# Patient Record
Sex: FEMALE | Race: WHITE | NOT HISPANIC OR LATINO | URBAN - METROPOLITAN AREA
[De-identification: names, ages, dates, MRNs, and addresses within clinical notes are randomized per-mention and may not be internally consistent; named-entity substitution may affect disease eponyms.]

---

## 2017-01-13 ENCOUNTER — OUTPATIENT (OUTPATIENT)
Dept: OUTPATIENT SERVICES | Facility: HOSPITAL | Age: 67
LOS: 1 days | End: 2017-01-13

## 2017-05-02 ENCOUNTER — OUTPATIENT (OUTPATIENT)
Dept: OUTPATIENT SERVICES | Facility: HOSPITAL | Age: 67
LOS: 1 days | End: 2017-05-02

## 2017-05-23 ENCOUNTER — OUTPATIENT (OUTPATIENT)
Dept: OUTPATIENT SERVICES | Facility: HOSPITAL | Age: 67
LOS: 1 days | End: 2017-05-23

## 2017-06-01 ENCOUNTER — OUTPATIENT (OUTPATIENT)
Dept: OUTPATIENT SERVICES | Facility: HOSPITAL | Age: 67
LOS: 1 days | End: 2017-06-01

## 2017-09-20 ENCOUNTER — OUTPATIENT (OUTPATIENT)
Dept: OUTPATIENT SERVICES | Facility: HOSPITAL | Age: 67
LOS: 1 days | End: 2017-09-20

## 2017-09-28 ENCOUNTER — OUTPATIENT (OUTPATIENT)
Dept: OUTPATIENT SERVICES | Facility: HOSPITAL | Age: 67
LOS: 1 days | End: 2017-09-28

## 2017-10-05 ENCOUNTER — OUTPATIENT (OUTPATIENT)
Dept: OUTPATIENT SERVICES | Facility: HOSPITAL | Age: 67
LOS: 1 days | End: 2017-10-05
Payer: MEDICARE

## 2017-10-05 PROCEDURE — 72110 X-RAY EXAM L-2 SPINE 4/>VWS: CPT | Mod: 26

## 2017-10-05 PROCEDURE — 72148 MRI LUMBAR SPINE W/O DYE: CPT | Mod: 26

## 2018-01-11 ENCOUNTER — OUTPATIENT (OUTPATIENT)
Dept: OUTPATIENT SERVICES | Facility: HOSPITAL | Age: 68
LOS: 1 days | End: 2018-01-11

## 2018-05-30 ENCOUNTER — OUTPATIENT (OUTPATIENT)
Dept: OUTPATIENT SERVICES | Facility: HOSPITAL | Age: 68
LOS: 1 days | End: 2018-05-30

## 2018-10-09 ENCOUNTER — OUTPATIENT (OUTPATIENT)
Dept: OUTPATIENT SERVICES | Facility: HOSPITAL | Age: 68
LOS: 1 days | End: 2018-10-09

## 2018-11-05 ENCOUNTER — OUTPATIENT (OUTPATIENT)
Dept: OUTPATIENT SERVICES | Facility: HOSPITAL | Age: 68
LOS: 1 days | End: 2018-11-05
Payer: MEDICARE

## 2018-11-05 PROCEDURE — 76536 US EXAM OF HEAD AND NECK: CPT | Mod: 26

## 2018-11-13 ENCOUNTER — OUTPATIENT (OUTPATIENT)
Dept: OUTPATIENT SERVICES | Facility: HOSPITAL | Age: 68
LOS: 1 days | End: 2018-11-13

## 2018-11-14 ENCOUNTER — OUTPATIENT (OUTPATIENT)
Dept: OUTPATIENT SERVICES | Facility: HOSPITAL | Age: 68
LOS: 1 days | End: 2018-11-14
Payer: MEDICARE

## 2018-11-14 PROCEDURE — 70491 CT SOFT TISSUE NECK W/DYE: CPT | Mod: 26

## 2018-11-15 ENCOUNTER — OUTPATIENT (OUTPATIENT)
Dept: OUTPATIENT SERVICES | Facility: HOSPITAL | Age: 68
LOS: 1 days | End: 2018-11-15

## 2019-01-23 ENCOUNTER — OUTPATIENT (OUTPATIENT)
Dept: OUTPATIENT SERVICES | Facility: HOSPITAL | Age: 69
LOS: 1 days | End: 2019-01-23

## 2019-05-23 ENCOUNTER — OUTPATIENT (OUTPATIENT)
Dept: OUTPATIENT SERVICES | Facility: HOSPITAL | Age: 69
LOS: 1 days | End: 2019-05-23

## 2019-06-03 ENCOUNTER — OUTPATIENT (OUTPATIENT)
Dept: OUTPATIENT SERVICES | Facility: HOSPITAL | Age: 69
LOS: 1 days | End: 2019-06-03

## 2019-09-03 ENCOUNTER — OUTPATIENT (OUTPATIENT)
Dept: OUTPATIENT SERVICES | Facility: HOSPITAL | Age: 69
LOS: 1 days | End: 2019-09-03

## 2019-09-18 ENCOUNTER — OUTPATIENT (OUTPATIENT)
Dept: OUTPATIENT SERVICES | Facility: HOSPITAL | Age: 69
LOS: 1 days | End: 2019-09-18
Payer: MEDICARE

## 2019-09-18 PROCEDURE — 74178 CT ABD&PLV WO CNTR FLWD CNTR: CPT | Mod: 26

## 2019-09-19 ENCOUNTER — OUTPATIENT (OUTPATIENT)
Dept: OUTPATIENT SERVICES | Facility: HOSPITAL | Age: 69
LOS: 1 days | End: 2019-09-19

## 2019-09-30 ENCOUNTER — OUTPATIENT (OUTPATIENT)
Dept: OUTPATIENT SERVICES | Facility: HOSPITAL | Age: 69
LOS: 1 days | End: 2019-09-30

## 2019-10-10 ENCOUNTER — OUTPATIENT (OUTPATIENT)
Dept: OUTPATIENT SERVICES | Facility: HOSPITAL | Age: 69
LOS: 1 days | End: 2019-10-10

## 2019-10-31 ENCOUNTER — OUTPATIENT (OUTPATIENT)
Dept: OUTPATIENT SERVICES | Facility: HOSPITAL | Age: 69
LOS: 1 days | End: 2019-10-31

## 2019-12-10 ENCOUNTER — OUTPATIENT (OUTPATIENT)
Dept: OUTPATIENT SERVICES | Facility: HOSPITAL | Age: 69
LOS: 1 days | End: 2019-12-10

## 2020-03-26 ENCOUNTER — OUTPATIENT (OUTPATIENT)
Dept: OUTPATIENT SERVICES | Facility: HOSPITAL | Age: 70
LOS: 1 days | End: 2020-03-26

## 2020-04-15 ENCOUNTER — APPOINTMENT (OUTPATIENT)
Dept: ULTRASOUND IMAGING | Facility: CLINIC | Age: 70
End: 2020-04-15
Payer: MEDICARE

## 2020-04-15 PROCEDURE — 93971 EXTREMITY STUDY: CPT | Mod: LT

## 2020-06-26 ENCOUNTER — OUTPATIENT (OUTPATIENT)
Dept: OUTPATIENT SERVICES | Facility: HOSPITAL | Age: 70
LOS: 1 days | End: 2020-06-26

## 2020-07-27 ENCOUNTER — APPOINTMENT (OUTPATIENT)
Dept: MAMMOGRAPHY | Facility: CLINIC | Age: 70
End: 2020-07-27
Payer: MEDICARE

## 2020-07-27 ENCOUNTER — APPOINTMENT (OUTPATIENT)
Dept: RADIOLOGY | Facility: CLINIC | Age: 70
End: 2020-07-27

## 2020-07-27 PROCEDURE — 77080 DXA BONE DENSITY AXIAL: CPT

## 2020-07-27 PROCEDURE — 77067 SCR MAMMO BI INCL CAD: CPT

## 2020-07-27 PROCEDURE — 77063 BREAST TOMOSYNTHESIS BI: CPT

## 2020-11-17 ENCOUNTER — OUTPATIENT (OUTPATIENT)
Dept: OUTPATIENT SERVICES | Facility: HOSPITAL | Age: 70
LOS: 1 days | End: 2020-11-17

## 2020-12-03 ENCOUNTER — APPOINTMENT (OUTPATIENT)
Dept: MRI IMAGING | Facility: CLINIC | Age: 70
End: 2020-12-03
Payer: MEDICARE

## 2020-12-03 PROCEDURE — 73721 MRI JNT OF LWR EXTRE W/O DYE: CPT | Mod: LT,MH

## 2021-06-08 ENCOUNTER — OUTPATIENT (OUTPATIENT)
Dept: OUTPATIENT SERVICES | Facility: HOSPITAL | Age: 71
LOS: 1 days | End: 2021-06-08

## 2021-07-09 ENCOUNTER — OUTPATIENT (OUTPATIENT)
Dept: OUTPATIENT SERVICES | Facility: HOSPITAL | Age: 71
LOS: 1 days | End: 2021-07-09

## 2021-08-25 ENCOUNTER — APPOINTMENT (OUTPATIENT)
Dept: RADIOLOGY | Facility: CLINIC | Age: 71
End: 2021-08-25
Payer: MEDICARE

## 2021-08-25 ENCOUNTER — APPOINTMENT (OUTPATIENT)
Dept: MRI IMAGING | Facility: CLINIC | Age: 71
End: 2021-08-25
Payer: MEDICARE

## 2021-08-25 PROCEDURE — 72148 MRI LUMBAR SPINE W/O DYE: CPT | Mod: MH

## 2021-08-25 PROCEDURE — 73502 X-RAY EXAM HIP UNI 2-3 VIEWS: CPT | Mod: RT

## 2021-12-10 ENCOUNTER — OUTPATIENT (OUTPATIENT)
Dept: OUTPATIENT SERVICES | Facility: HOSPITAL | Age: 71
LOS: 1 days | End: 2021-12-10

## 2022-04-28 ENCOUNTER — OUTPATIENT (OUTPATIENT)
Dept: OUTPATIENT SERVICES | Facility: HOSPITAL | Age: 72
LOS: 1 days | End: 2022-04-28
Payer: MEDICARE

## 2022-04-28 DIAGNOSIS — R94.6 ABNORMAL RESULTS OF THYROID FUNCTION STUDIES: ICD-10-CM

## 2022-04-29 PROCEDURE — 78014 THYROID IMAGING W/BLOOD FLOW: CPT | Mod: 26,MH

## 2022-06-10 ENCOUNTER — APPOINTMENT (OUTPATIENT)
Dept: RADIOLOGY | Facility: CLINIC | Age: 72
End: 2022-06-10
Payer: MEDICARE

## 2022-06-10 PROCEDURE — 71046 X-RAY EXAM CHEST 2 VIEWS: CPT

## 2022-11-11 ENCOUNTER — NON-APPOINTMENT (OUTPATIENT)
Age: 72
End: 2022-11-11

## 2022-11-11 RX ORDER — EZETIMIBE 10 MG/1
10 TABLET ORAL
Refills: 0 | Status: ACTIVE | COMMUNITY

## 2023-01-04 ENCOUNTER — APPOINTMENT (OUTPATIENT)
Dept: ENDOCRINOLOGY | Facility: CLINIC | Age: 73
End: 2023-01-04
Payer: MEDICARE

## 2023-01-04 VITALS
HEART RATE: 65 BPM | TEMPERATURE: 98.4 F | BODY MASS INDEX: 30.53 KG/M2 | WEIGHT: 190 LBS | SYSTOLIC BLOOD PRESSURE: 116 MMHG | DIASTOLIC BLOOD PRESSURE: 62 MMHG | HEIGHT: 66 IN | OXYGEN SATURATION: 97 %

## 2023-01-04 DIAGNOSIS — E11.9 TYPE 2 DIABETES MELLITUS W/OUT COMPLICATIONS: ICD-10-CM

## 2023-01-04 DIAGNOSIS — Z79.4 TYPE 2 DIABETES MELLITUS W/OUT COMPLICATIONS: ICD-10-CM

## 2023-01-04 PROCEDURE — 99214 OFFICE O/P EST MOD 30 MIN: CPT

## 2023-01-04 NOTE — HISTORY OF PRESENT ILLNESS
[FreeTextEntry1] : I this is a 72-year-old white female with a past medical history of a type 2 diabetes currently on insulin mix 7525 with 25 units in the morning 15 units in the evening and, insulin Lantus 25 units every day at night, Farxiga 10 mg daily and Victoza 1.2 mg daily.  Patient also has a history of elevated cholesterol and primary hypertension..  According to the patient she recently returned from [place] but had a very stressful episode where her  was hospitalized for a stroke.  Since she has returned her sugar levels have been elevated anywhere between 150 to 250 mg per DL.  She claims that her appetite is very good and denies any symptoms of hypothyroidism..  Her vision has been stable and she denies any numbness of her extremities.  Her weight is stable.  She denies any chest pain shortness of breath nausea vomiting abdominal pain or diarrhea.  Patient also has a diagnosis of hyper thyroidism for which she is taking methimazole 10 mg tablets daily

## 2023-01-04 NOTE — ASSESSMENT
[Diabetes Foot Care] : diabetes foot care [Long Term Vascular Complications] : long term vascular complications of diabetes [Carbohydrate Consistent Diet] : carbohydrate consistent diet [Importance of Diet and Exercise] : importance of diet and exercise to improve glycemic control, achieve weight loss and improve cardiovascular health [Exercise/Effect on Glucose] : exercise/effect on glucose [Hypoglycemia Management] : hypoglycemia management [Self Monitoring of Blood Glucose] : self monitoring of blood glucose [Retinopathy Screening] : Patient was referred to ophthalmology for retinopathy screening [FreeTextEntry1] : Pleasant middle-aged white female who has a past medical history of uncontrolled type 2 diabetes with hypothyroidism.  Her other comorbid conditions include hyperlipidemia and hypertension.  Patient appears to be noncompliant with diet due to the recent stress which was caused by her 's illness.  However the patient is not monitoring her dietary intake and her glucose levels have been fluctuating anywhere between 100 to 300 mg per DL.  Patient's labs from 12/13/2022 show a TSH of 5.06, complete metabolic panel is normal except for serum creatinine of 1.38, her hemoglobin A1c was significantly elevated at 9.5%, lipid panel showed an LDL of 71 total cholesterol of 161 mg per DL, her free T4 is 0.9.  My clinical impression is that this pleasant middle-aged white female is not very compliant with her diet which is the primary factor contributing to mostly elevated glucose levels.  However she also has been experiencing significant family stress and her eating habits have not changed.  Recommendation\par 1.  I have advised the patient to change her insulin Humalog 75/25 to plain insulin Humalog before the meals anywhere between 5 to 15 units depending on her on her glucose levels.\par 2.  We shall continue the patient on Farxiga 10 mg daily insulin Lantus 25 units every day and also the Victoza.\par 3.  I am also lowering the patient's dose of methimazole to 5 mg tablets daily as the TSH level is slightly elevated.  We will monitor edema in about 2 to 3 months and if it improves we will make reduce the dose of the methimazole even further.  The above plan was discussed in detail with the patient.  Strict glycemic control was stressed upon the patient in order to prevent any vascular complications.

## 2023-01-04 NOTE — REVIEW OF SYSTEMS
[Fatigue] : fatigue [Decreased Appetite] : appetite not decreased [Insomnia] : insomnia [Polydipsia] : polydipsia [Negative] : Heme/Lymph [de-identified] : Polydipsia and chronic fatigue

## 2023-01-04 NOTE — PHYSICAL EXAM
[Alert] : alert [Well Nourished] : well nourished [Obese] : obese [Normal Sclera/Conjunctiva] : normal sclera/conjunctiva [PERRL] : pupils equal, round and reactive to light [Normal Outer Ear/Nose] : the ears and nose were normal in appearance [Normal Hearing] : hearing was normal [Normal Nasal Mucosa] : the nasal mucosa was normal [Normal Oropharynx] : the oropharynx was normal [Clear to Auscultation] : lungs were clear to auscultation bilaterally [Normal S1, S2] : normal S1 and S2 [No Murmurs] : no murmurs [Normal Rate] : heart rate was normal [Regular Rhythm] : with a regular rhythm [Carotids Normal] : carotid pulses were normal with no bruits [No Edema] : no peripheral edema [Pedal Pulses Normal] : the pedal pulses are present [Not Tender] : non-tender [No Masses] : no abdominal mass palpated [Soft] : abdomen soft [No HSM] : no hepato-splenomegaly [Normal Supraclavicular Nodes] : no supraclavicular lymphadenopathy [Normal Anterior Cervical Nodes] : no anterior cervical lymphadenopathy [Normal Posterior Cervical Nodes] : no posterior cervical lymphadenopathy [No CVA Tenderness] : no ~M costovertebral angle tenderness [No Spinal Tenderness] : no spinal tenderness [Normal Gait] : normal gait [No Clubbing, Cyanosis] : no clubbing  or cyanosis of the fingernails [No Joint Swelling] : no joint swelling seen [No Rash] : no rash [No Skin Lesions] : no skin lesions [No Motor Deficits] : the motor exam was normal [Normal Reflexes] : deep tendon reflexes were 2+ and symmetric [No Tremors] : no tremors [Oriented x3] : oriented to person, place, and time [Normal Affect] : the affect was normal [de-identified] : Slightly prominent thyroid gland with mild nodularity [de-identified] : Deferred [FreeTextEntry1] : Deferred [de-identified] : Deferred [de-identified] : Dry skin

## 2023-04-26 ENCOUNTER — APPOINTMENT (OUTPATIENT)
Dept: ENDOCRINOLOGY | Facility: CLINIC | Age: 73
End: 2023-04-26
Payer: MEDICARE

## 2023-04-26 VITALS
DIASTOLIC BLOOD PRESSURE: 60 MMHG | HEART RATE: 80 BPM | OXYGEN SATURATION: 99 % | TEMPERATURE: 98.2 F | HEIGHT: 66 IN | BODY MASS INDEX: 31.18 KG/M2 | WEIGHT: 194 LBS | SYSTOLIC BLOOD PRESSURE: 126 MMHG | RESPIRATION RATE: 14 BRPM

## 2023-04-26 DIAGNOSIS — Z79.4 TYPE 2 DIABETES MELLITUS WITH DIABETIC CHRONIC KIDNEY DISEASE: ICD-10-CM

## 2023-04-26 DIAGNOSIS — E11.22 TYPE 2 DIABETES MELLITUS WITH DIABETIC CHRONIC KIDNEY DISEASE: ICD-10-CM

## 2023-04-26 DIAGNOSIS — Z00.00 ENCOUNTER FOR GENERAL ADULT MEDICAL EXAMINATION W/OUT ABNORMAL FINDINGS: ICD-10-CM

## 2023-04-26 PROCEDURE — 99214 OFFICE O/P EST MOD 30 MIN: CPT

## 2023-04-26 RX ORDER — LIRAGLUTIDE 6 MG/ML
18 INJECTION SUBCUTANEOUS DAILY
Qty: 27 | Refills: 3 | Status: DISCONTINUED | COMMUNITY
Start: 2023-01-04 | End: 2023-04-26

## 2023-04-26 RX ORDER — DAPAGLIFLOZIN 10 MG/1
10 TABLET, FILM COATED ORAL
Refills: 0 | Status: DISCONTINUED | COMMUNITY
End: 2023-04-26

## 2023-04-26 NOTE — HISTORY OF PRESENT ILLNESS
[FreeTextEntry1] : This is a 70-year-old white female who has a past medical history of uncontrolled type 2 diabetes hyperlipidemia hypertension and hypothyroidism.  Patient is currently on Farxiga 10 mg tablets daily, Lantus 25 units every day at night Victoza which she ran out of it insulin Humalog as needed before meals and methimazole 5 mg tablets every day.  She also has a history of elevated cholesterol patient recently has been under taking care of her . .  She has not noticed any numbness of her diarrhea.  Review of systems is otherwise negative her glucose levels have been elevated in the morning between 1  and after the meals more than 250 mg per DL.  Her other medications include Crestor 40 mg daily, lisinopril with hydrochlorothiazide, methimazole 5 mg tablets daily Zetia milligrams daily.  She denies any symptoms of hypoglycemia.

## 2023-04-26 NOTE — REVIEW OF SYSTEMS
[Fatigue] : fatigue [Polyuria] : polyuria [Dysuria] : dysuria [Nocturia] : nocturia [Dizziness] : no dizziness [Confusion] : confusion [Tremors] : no tremors [Pain/Numbness of Digits] : no pain/numbness of digits [Depression] : depression [Insomnia] : insomnia [Anxiety] : anxiety [Polydipsia] : polydipsia [Negative] : Heme/Lymph

## 2023-04-26 NOTE — ASSESSMENT
[Diabetes Foot Care] : diabetes foot care [Long Term Vascular Complications] : long term vascular complications of diabetes [Carbohydrate Consistent Diet] : carbohydrate consistent diet [Importance of Diet and Exercise] : importance of diet and exercise to improve glycemic control, achieve weight loss and improve cardiovascular health [Exercise/Effect on Glucose] : exercise/effect on glucose [Hypoglycemia Management] : hypoglycemia management [Self Monitoring of Blood Glucose] : self monitoring of blood glucose [Retinopathy Screening] : Patient was referred to ophthalmology for retinopathy screening [FreeTextEntry1] : Middle-aged white female who has a past medical history of uncontrolled [place] hyperlipidemia hypertension and hypothyroidism.  She also has underlying significant anxiety.  She is not on the diet.  Her latest blood test from April 14, 2023 show elevated hemoglobin A1c of 9.6%, BUN is 36 creatinine 1.20 her GFR is 48.  Her TSH is slightly elevated at 4.6 Free T4 is low normal at 0.93.  Clinical impression is that this may male who is not very compliant with the diet and exercise has persistently elevated levels of hemoglobin A1c due to her noncompliance with exercise.  Patient claimed that she is always feeling very hungry and has to take significant amount of snacks and meal portions.  Recommendation\par 1.  The patient is unable to afford to buy the Victoza at this time and for this reason I am starting her on Soliqua insulin 30 units every day in the morning.  Side effects of the medication were explained to the patient\par 2.  We will continue her on the Farxiga 10 mg daily, insulin Lantus 25 units at night and Humalog insulin before meals as needed.\par 3.  Regarding her thyroid disorder I would lower the dose of the methimazole to 2.5 mg tablets every day and repeat her thyroid profile in approximately 6 to 8 weeks.\par 4.  Patient will continue the remaining medications which include daily lisinopril with hydrochlorothiazide, Crestor and Zetia.\par 5.  Patient is made aware of the need to maintain normal blood sugar levels now to prevent any future vascular complications.  The plan was discussed in detail with the patient , she will return to the office in approximately 3 to 4 months time after obtaining a repeat blood analysis.  Thank you

## 2023-05-12 ENCOUNTER — RX RENEWAL (OUTPATIENT)
Age: 73
End: 2023-05-12

## 2023-08-29 ENCOUNTER — APPOINTMENT (OUTPATIENT)
Dept: ENDOCRINOLOGY | Facility: CLINIC | Age: 73
End: 2023-08-29
Payer: MEDICARE

## 2023-08-29 VITALS
DIASTOLIC BLOOD PRESSURE: 60 MMHG | SYSTOLIC BLOOD PRESSURE: 120 MMHG | TEMPERATURE: 98 F | HEART RATE: 64 BPM | WEIGHT: 207 LBS | RESPIRATION RATE: 16 BRPM | HEIGHT: 66 IN | BODY MASS INDEX: 33.27 KG/M2 | OXYGEN SATURATION: 97 %

## 2023-08-29 DIAGNOSIS — E78.5 HYPERLIPIDEMIA, UNSPECIFIED: ICD-10-CM

## 2023-08-29 LAB
ALBUMIN SERPL ELPH-MCNC: 4.1 G/DL
ALP BLD-CCNC: 92 U/L
ALT SERPL-CCNC: 32 U/L
ANION GAP SERPL CALC-SCNC: 11 MMOL/L
AST SERPL-CCNC: 36 U/L
BILIRUB SERPL-MCNC: 0.3 MG/DL
BUN SERPL-MCNC: 22 MG/DL
CALCIUM SERPL-MCNC: 9.1 MG/DL
CHLORIDE SERPL-SCNC: 107 MMOL/L
CO2 SERPL-SCNC: 26 MMOL/L
CREAT SERPL-MCNC: 1.22 MG/DL
EGFR: 47 ML/MIN/1.73M2
ESTIMATED AVERAGE GLUCOSE: 209 MG/DL
GLUCOSE SERPL-MCNC: 121 MG/DL
HBA1C MFR BLD HPLC: 8.9 %
POTASSIUM SERPL-SCNC: 5 MMOL/L
PROT SERPL-MCNC: 6.1 G/DL
SODIUM SERPL-SCNC: 144 MMOL/L
T4 FREE SERPL-MCNC: 0.9 NG/DL
TSH SERPL-ACNC: 3.15 UIU/ML

## 2023-08-29 PROCEDURE — 99213 OFFICE O/P EST LOW 20 MIN: CPT

## 2023-08-29 RX ORDER — INSULIN GLARGINE AND LIXISENATIDE 100; 33 U/ML; UG/ML
100-33 INJECTION, SOLUTION SUBCUTANEOUS EVERY MORNING
Qty: 9 | Refills: 0 | Status: DISCONTINUED | COMMUNITY
Start: 2023-04-26 | End: 2023-08-29

## 2023-08-29 RX ORDER — ORAL SEMAGLUTIDE 7 MG/1
7 TABLET ORAL
Qty: 90 | Refills: 3 | Status: ACTIVE | COMMUNITY
Start: 2023-08-29 | End: 1900-01-01

## 2023-08-29 NOTE — HISTORY OF PRESENT ILLNESS
[FreeTextEntry1] : 72-year-old white female with a past medical history of type 2 diabetes currently maintained on Farxiga 10 mg daily and insulin Lantus at 25 units every day at night and Humalog insulin as per the sliding scale before meals.  Patient was also taking Soliqua 30 units every day in the morning but she claimed that she stopped it as it was not helping her to curb her appetite to lose any weight.  She also has a history of hypertension and hypothyroidism for which she is taking methimazole half a tablet of 5 mg every day..  She denies any dysuria, chest pain, shortness of breath.  She does report that she is always very hungry and is constantly eating while she is at home.  Her current medications also include Crestor, lisinopril/hydrochlorothiazide.And Zetia.  Physically she is not very active and does not exercise on a regular basis except for occasional walks around the complex where she resides

## 2023-08-29 NOTE — PHYSICAL EXAM
[Alert] : alert [Healthy Appearance] : healthy appearance [No Acute Distress] : no acute distress [Well Developed] : well developed [Normal Sclera/Conjunctiva] : normal sclera/conjunctiva [PERRL] : pupils equal, round and reactive to light [Normal Outer Ear/Nose] : the ears and nose were normal in appearance [Normal Hearing] : hearing was normal [No Neck Mass] : no neck mass was observed [Thyroid Not Enlarged] : the thyroid was not enlarged [No Respiratory Distress] : no respiratory distress [Clear to Auscultation] : lungs were clear to auscultation bilaterally [Normal S1, S2] : normal S1 and S2 [Normal Rate] : heart rate was normal [Regular Rhythm] : with a regular rhythm [Carotids Normal] : carotid pulses were normal with no bruits [Pedal Pulses Normal] : the pedal pulses are present [Not Tender] : non-tender [Not Distended] : not distended [Soft] : abdomen soft [No HSM] : no hepato-splenomegaly [Normal Supraclavicular Nodes] : no supraclavicular lymphadenopathy [Normal Anterior Cervical Nodes] : no anterior cervical lymphadenopathy [Normal Posterior Cervical Nodes] : no posterior cervical lymphadenopathy [Normal Gait] : normal gait [No Clubbing, Cyanosis] : no clubbing  or cyanosis of the fingernails [No Rash] : no rash [No Skin Lesions] : no skin lesions [Abdominal Striae] : no abdominal striae [Acanthosis Nigricans] : no acanthosis nigricans [Foot Ulcers] : no foot ulcers [No Motor Deficits] : the motor exam was normal [No Sensory Deficits] : the sensory exam was normal to light touch and pinprick [Normal Reflexes] : deep tendon reflexes were 2+ and symmetric [No Tremors] : no tremors [Oriented x3] : oriented to person, place, and time [de-identified] : Mild bilateral trace edema more prominent of the left ankle [de-identified] : Deferred [FreeTextEntry1] : Deferred [de-identified] : Deferred [de-identified] : Weight gain, anxiety

## 2023-08-29 NOTE — REVIEW OF SYSTEMS
[Fatigue] : fatigue [Dizziness] : no dizziness [Tremors] : no tremors [Pain/Numbness of Digits] : no pain/numbness of digits [Polydipsia] : polydipsia [Heat Intolerance] : heat intolerance [Negative] : Heme/Lymph

## 2023-08-29 NOTE — ASSESSMENT
[Diabetes Foot Care] : diabetes foot care [Long Term Vascular Complications] : long term vascular complications of diabetes [Carbohydrate Consistent Diet] : carbohydrate consistent diet [Importance of Diet and Exercise] : importance of diet and exercise to improve glycemic control, achieve weight loss and improve cardiovascular health [Exercise/Effect on Glucose] : exercise/effect on glucose [Hypoglycemia Management] : hypoglycemia management [Self Monitoring of Blood Glucose] : self monitoring of blood glucose [Retinopathy Screening] : Patient was referred to ophthalmology for retinopathy screening [FreeTextEntry1] : Middle-age white female with a past medical history of obesity, type 2 diabetes on combination of insulin with Farxiga and alcohol.  Patient is noncompliant with the diet.  She until recently was on Soliqua which she had discontinued.  Patient had a blood test done on August 22 which showed a normal complete metabolic panel except for a GFR of 47, TSH is 3.15 Free T4 is 0.9 hemoglobin A1c is 8.9% the above findings were reviewed with the patient.  Clinical impression is that this middle-age white female has uncontrolled type 2 diabetes most likely secondary to poor compliance with diet and exercise.  Recommendation 1.  I have advised the patient to give a trial with Rybelsus 3 mg tablets daily in the morning on empty stomach.  Benefits and side effects were explained to the patient.  If she does not develop any significant side effects then we will increase the dose to 7 mg after 4 weeks..  This was explained to the patient in detail. 2.  Patient will continue with insulin Lantus 25 units at night together with Humalog insulin before meals and the Farxiga 10 mg tablets daily. 3.  Patient will have a repeat blood test performed in 3 months time and if the thyroid function test remains stable we will then slowly taper her off the methimazole.  The plan was discussed in detail with the patient.  Thank you

## 2023-11-06 ENCOUNTER — RX RENEWAL (OUTPATIENT)
Age: 73
End: 2023-11-06

## 2023-11-06 RX ORDER — DAPAGLIFLOZIN 10 MG/1
10 TABLET, FILM COATED ORAL DAILY
Qty: 90 | Refills: 3 | Status: ACTIVE | COMMUNITY
Start: 2023-01-04 | End: 1900-01-01

## 2023-12-05 ENCOUNTER — APPOINTMENT (OUTPATIENT)
Dept: ULTRASOUND IMAGING | Facility: CLINIC | Age: 73
End: 2023-12-05
Payer: MEDICARE

## 2023-12-05 PROCEDURE — 93880 EXTRACRANIAL BILAT STUDY: CPT

## 2023-12-06 ENCOUNTER — APPOINTMENT (OUTPATIENT)
Dept: RADIOLOGY | Facility: CLINIC | Age: 73
End: 2023-12-06

## 2023-12-06 ENCOUNTER — APPOINTMENT (OUTPATIENT)
Dept: ULTRASOUND IMAGING | Facility: CLINIC | Age: 73
End: 2023-12-06
Payer: MEDICARE

## 2023-12-06 ENCOUNTER — APPOINTMENT (OUTPATIENT)
Dept: MAMMOGRAPHY | Facility: CLINIC | Age: 73
End: 2023-12-06

## 2023-12-06 PROCEDURE — 77080 DXA BONE DENSITY AXIAL: CPT

## 2023-12-06 PROCEDURE — 77063 BREAST TOMOSYNTHESIS BI: CPT

## 2023-12-06 PROCEDURE — 77067 SCR MAMMO BI INCL CAD: CPT

## 2023-12-06 PROCEDURE — 76775 US EXAM ABDO BACK WALL LIM: CPT

## 2023-12-08 ENCOUNTER — APPOINTMENT (OUTPATIENT)
Dept: ENDOCRINOLOGY | Facility: CLINIC | Age: 73
End: 2023-12-08

## 2023-12-12 ENCOUNTER — RX ONLY (RX ONLY)
Age: 73
End: 2023-12-12

## 2023-12-12 ENCOUNTER — OFFICE (OUTPATIENT)
Facility: LOCATION | Age: 73
Setting detail: OPHTHALMOLOGY
End: 2023-12-12
Payer: MEDICARE

## 2023-12-12 DIAGNOSIS — H52.4: ICD-10-CM

## 2023-12-12 DIAGNOSIS — H10.433: ICD-10-CM

## 2023-12-12 DIAGNOSIS — H43.813: ICD-10-CM

## 2023-12-12 DIAGNOSIS — H35.033: ICD-10-CM

## 2023-12-12 DIAGNOSIS — H52.221: ICD-10-CM

## 2023-12-12 DIAGNOSIS — H35.373: ICD-10-CM

## 2023-12-12 DIAGNOSIS — H16.223: ICD-10-CM

## 2023-12-12 DIAGNOSIS — E11.3293: ICD-10-CM

## 2023-12-12 DIAGNOSIS — H43.393: ICD-10-CM

## 2023-12-12 DIAGNOSIS — H52.12: ICD-10-CM

## 2023-12-12 PROBLEM — Z96.1 PSEUDOPHAKIA ; BOTH EYES: Status: ACTIVE | Noted: 2023-12-12

## 2023-12-12 PROCEDURE — 92015 DETERMINE REFRACTIVE STATE: CPT | Performed by: OPHTHALMOLOGY

## 2023-12-12 PROCEDURE — 92134 CPTRZ OPH DX IMG PST SGM RTA: CPT | Performed by: OPHTHALMOLOGY

## 2023-12-12 PROCEDURE — 92014 COMPRE OPH EXAM EST PT 1/>: CPT | Performed by: OPHTHALMOLOGY

## 2023-12-12 ASSESSMENT — CONFRONTATIONAL VISUAL FIELD TEST (CVF)
OS_FINDINGS: FULL
OD_FINDINGS: FULL

## 2023-12-12 ASSESSMENT — SUPERFICIAL PUNCTATE KERATITIS (SPK)
OS_SPK: T 1+
OD_SPK: T 1+

## 2023-12-13 ASSESSMENT — REFRACTION_CURRENTRX
OD_CYLINDER: +1.25
OS_SPHERE: -0.50
OD_OVR_VA: 20/
OD_ADD: +2.50
OD_AXIS: 160
OS_CYLINDER: SPHERE
OS_ADD: +2.50
OS_OVR_VA: 20/
OD_SPHERE: PLANO

## 2023-12-13 ASSESSMENT — REFRACTION_MANIFEST
OD_ADD: +2.50
OS_ADD: +2.50
OD_VA1: 20/30
OS_CYLINDER: SPHERE
OD_AXIS: 160
OD_CYLINDER: +1.00
OD_SPHERE: PLANO
OS_SPHERE: -0.50
OS_VA1: 20/30

## 2024-01-03 RX ORDER — METHIMAZOLE 5 MG/1
5 TABLET ORAL
Qty: 45 | Refills: 3 | Status: ACTIVE | COMMUNITY
Start: 1900-01-01 | End: 1900-01-01

## 2024-01-25 RX ORDER — BLOOD SUGAR DIAGNOSTIC
STRIP MISCELLANEOUS
Qty: 2 | Refills: 3 | Status: ACTIVE | COMMUNITY
Start: 2024-01-25 | End: 1900-01-01

## 2024-02-08 ENCOUNTER — APPOINTMENT (OUTPATIENT)
Dept: ENDOCRINOLOGY | Facility: CLINIC | Age: 74
End: 2024-02-08
Payer: MEDICARE

## 2024-02-08 VITALS
WEIGHT: 195 LBS | OXYGEN SATURATION: 98 % | HEART RATE: 68 BPM | RESPIRATION RATE: 16 BRPM | TEMPERATURE: 97.6 F | SYSTOLIC BLOOD PRESSURE: 130 MMHG | BODY MASS INDEX: 31.34 KG/M2 | HEIGHT: 66 IN | DIASTOLIC BLOOD PRESSURE: 60 MMHG

## 2024-02-08 DIAGNOSIS — E05.90 THYROTOXICOSIS, UNSPECIFIED W/OUT THYROTOXIC CRISIS OR STORM: ICD-10-CM

## 2024-02-08 DIAGNOSIS — I10 ESSENTIAL (PRIMARY) HYPERTENSION: ICD-10-CM

## 2024-02-08 PROCEDURE — 99214 OFFICE O/P EST MOD 30 MIN: CPT

## 2024-02-08 RX ORDER — ROSUVASTATIN CALCIUM 40 MG/1
40 TABLET, FILM COATED ORAL
Refills: 0 | Status: DISCONTINUED | COMMUNITY
End: 2024-02-08

## 2024-02-08 RX ORDER — MULTIVIT-MIN/IRON/FOLIC ACID/K 18-600-40
CAPSULE ORAL
Refills: 0 | Status: ACTIVE | COMMUNITY

## 2024-02-08 RX ORDER — VERAPAMIL HYDROCHLORIDE 120 MG/1
120 TABLET ORAL
Refills: 0 | Status: ACTIVE | COMMUNITY

## 2024-02-08 RX ORDER — LISINOPRIL AND HYDROCHLOROTHIAZIDE TABLETS 10; 12.5 MG/1; MG/1
TABLET ORAL
Refills: 0 | Status: DISCONTINUED | COMMUNITY
End: 2024-02-08

## 2024-02-08 RX ORDER — INSULIN GLARGINE 100 [IU]/ML
INJECTION, SOLUTION SUBCUTANEOUS
Refills: 0 | Status: DISCONTINUED | COMMUNITY
End: 2024-02-08

## 2024-02-08 RX ORDER — ESCITALOPRAM OXALATE 10 MG/1
10 TABLET, FILM COATED ORAL
Refills: 0 | Status: ACTIVE | COMMUNITY

## 2024-02-08 RX ORDER — ACETAMINOPHEN 325 MG
TABLET ORAL
Refills: 0 | Status: ACTIVE | COMMUNITY

## 2024-02-08 RX ORDER — ASPIRIN 81 MG/1
81 TABLET ORAL
Refills: 0 | Status: ACTIVE | COMMUNITY

## 2024-02-08 RX ORDER — ORAL SEMAGLUTIDE 14 MG/1
14 TABLET ORAL
Qty: 90 | Refills: 2 | Status: ACTIVE | COMMUNITY
Start: 2024-02-08 | End: 1900-01-01

## 2024-02-08 RX ORDER — ROSUVASTATIN CALCIUM 40 MG/1
40 TABLET, FILM COATED ORAL
Refills: 0 | Status: ACTIVE | COMMUNITY

## 2024-02-08 RX ORDER — FLASH GLUCOSE SENSOR
KIT MISCELLANEOUS
Qty: 6 | Refills: 3 | Status: DISCONTINUED | COMMUNITY
Start: 2023-08-29 | End: 2024-02-08

## 2024-02-08 RX ORDER — FLASH GLUCOSE SCANNING READER
EACH MISCELLANEOUS
Qty: 1 | Refills: 0 | Status: DISCONTINUED | COMMUNITY
Start: 2023-08-29 | End: 2024-02-08

## 2024-02-08 NOTE — HISTORY OF PRESENT ILLNESS
[FreeTextEntry1] : Patient last seen 8/29/2023 most recent labs dated for 11/30/2023 from [facility]. 73-year-old white female who has a past medical history of uncontrolled type 2 diabetes, obesity, hyperlipidemia and hypothyroidism presents for routine follow-up.  Patient is currently taking Lantus 30 units every day at night, Farxiga 10 mg daily and Rybelsus 7 mg daily.  She also takes Humalog insulin 15 to 20 units 3 times a day before meals and methimazole 5 mg daily.  Patient admits that she is not very compliant with the diet and is not able to exercise.  She has slowly been gaining weight.  She denies any palpitation chest pain but she does have polyuria polydipsia but without any dysuria.  Patient denies any symptoms of palpitation chest pain nausea vomiting abdominal pain no skin rash.  Patient is expressing the ambition to see a bariatric surgeon for the purpose of weight loss as she is frustrated with her weight and and difficulty in losing it.

## 2024-02-08 NOTE — REVIEW OF SYSTEMS
[Fatigue] : fatigue [Dysphagia] : no dysphagia [Neck Pain] : no neck pain [Dysphonia] : no dysphonia [Nausea] : no nausea [Vomiting] : no vomiting [Polyuria] : no polyuria [Dysuria] : no dysuria [Nocturia] : nocturia [Incontinence] : incontinence [Headaches] : no headaches [Confusion] : confusion [Tremors] : no tremors [Pain/Numbness of Digits] : pain/numbness of digits [Anxiety] : anxiety [Stress] : stress [Galactorrhea] : no galactorrhea  [Negative] : Heme/Lymph [FreeTextEntry7] : Central obesity

## 2024-02-08 NOTE — ASSESSMENT
[FreeTextEntry1] : Middle-age white female who has a past medical history of uncontrolled type 2 diabetes with hypothyroidism and is currently taking insulin Lantus together with Farxiga and Rybelsus.  She has been tolerating the medications well without any side effects.  Clinically she appears to be euthyroid.  Patient is now tested in undergoing a gastric bypass surgery and also she has a history of urinary incontinence and would like to see a urologist for the correction of this symptom.  Recommendation 1.  I have advised the patient to continue with her current diabetic medications but I have decided to increase her Rybelsus to 14 mg tablets every day. 2.  Patient will continue with insulin Humalog 15 to 20 units 3 times a day and also the methimazole 5 mg tablet daily. 3.  I am referring the patient for a complete blood test in approximately 1 month's time. 4.  The patient is also being referred to a bariatric surgeon for evaluation for possible sleeve gastrectomy may and also she will be seeking opinion of a urologist to correct her urinary problem. 5.  Patient will continue her other medications which include rosuvastatin 40 mg daily, verapamil  daily, Zetia 10 mg daily, baby aspirin 81 mg tablet daily.  And she also takes escitalopram 10 mg daily for anxiety. 6..  Patient was explained in detail the importance of diet and exercise in order to control her symptoms of uncontrolled type 2 diabetes. 1.  She will return to the office in approximately 3 months time when we will review her results and make adjustments as necessary.  Thank you

## 2024-02-09 LAB
ESTIMATED AVERAGE GLUCOSE: 237
HBA1C MFR BLD HPLC: 9.9

## 2024-02-12 DIAGNOSIS — E11.9 TYPE 2 DIABETES MELLITUS W/OUT COMPLICATIONS: ICD-10-CM

## 2024-02-12 RX ORDER — PEN NEEDLE, DIABETIC 29 G X1/2"
32G X 4 MM NEEDLE, DISPOSABLE MISCELLANEOUS
Qty: 4 | Refills: 3 | Status: ACTIVE | COMMUNITY
Start: 2024-02-12 | End: 1900-01-01

## 2024-02-12 RX ORDER — INSULIN LISPRO 100 [IU]/ML
100 INJECTION, SOLUTION INTRAVENOUS; SUBCUTANEOUS
Qty: 4 | Refills: 3 | Status: ACTIVE | COMMUNITY
Start: 2023-01-04 | End: 1900-01-01

## 2024-03-14 PROBLEM — R35.0 FREQUENT URINATION: Status: ACTIVE | Noted: 2024-03-14

## 2024-03-15 ENCOUNTER — APPOINTMENT (OUTPATIENT)
Dept: UROGYNECOLOGY | Facility: CLINIC | Age: 74
End: 2024-03-15
Payer: MEDICARE

## 2024-03-15 DIAGNOSIS — R35.0 FREQUENCY OF MICTURITION: ICD-10-CM

## 2024-03-15 DIAGNOSIS — N39.46 MIXED INCONTINENCE: ICD-10-CM

## 2024-03-15 DIAGNOSIS — Z12.4 ENCOUNTER FOR SCREENING FOR MALIGNANT NEOPLASM OF CERVIX: ICD-10-CM

## 2024-03-15 DIAGNOSIS — N81.2 INCOMPLETE UTEROVAGINAL PROLAPSE: ICD-10-CM

## 2024-03-15 PROCEDURE — 51701 INSERT BLADDER CATHETER: CPT | Mod: 59

## 2024-03-15 PROCEDURE — 81003 URINALYSIS AUTO W/O SCOPE: CPT | Mod: QW

## 2024-03-15 PROCEDURE — 99459 PELVIC EXAMINATION: CPT

## 2024-03-15 PROCEDURE — 99204 OFFICE O/P NEW MOD 45 MIN: CPT | Mod: 25

## 2024-03-15 NOTE — PHYSICAL EXAM
[Chaperone Present] : A chaperone was present in the examining room during all aspects of the physical examination [FreeTextEntry1] : General: Not in acute distress, alert and oriented x3.   Neck: Supple. No lymphadenopathy.   Abdomen: Soft, nontender, and nondistended. No obvious hepatosplenomegaly. No obvious hernias.   Pelvic Exam: Normal external female genitalia. Saddle sensory exam S2 to S4 is intact. Perineal reflexes not visualized. Urethra is hypermobile without prolapse, exudates, or lesions. Cough stress test is negative with and without anterior vaginal wall reduction.  She voided approximately 100 cc.  Post void residual was checked with I/O cath and was 100 cc of concentrated urine urine. Pale and mildly atrophic-appearing vaginal epithelium. No vaginal blood or discharge. Cervix without abnormal lesions. Bimanual exam reveals a small uterus in normal positioning. No palpable adnexal masses or tenderness.    POPQ: Aa +1, Ba +1, C -1, TVL 9, D-4, GH 4, PB 3, Ap -1.5, Bp -1.5.

## 2024-03-15 NOTE — PROCEDURE
[Straight Catheterization] : insertion of a straight catheter [Urinary Frequency] : urinary frequency [Patient] : the patient [___ Fr Straight Tip] : a [unfilled] in [place] straight tip catheter [None] : there were no complications with the catheter insertion [Clear] : clear [No Complications] : no complications [Tolerated Well] : the patient tolerated the procedure well [Post procedure instructions and information given] : Post procedure instructions and information were given and reviewed with patient. [0] : 0 [Stress Incontinence] : stress incontinence [Urgent Incontinence] : urgent incontinence [Other ___] : [unfilled] [Culture] : culture [Urinalysis] : urinalysis

## 2024-03-15 NOTE — LETTER BODY
[I had the pleasure of evaluating your patient, [unfilled]. Thank you for referring Ms. [unfilled] for consultation for ___] : I had the pleasure of evaluating your patient, [unfilled]. Thank you for referring Ms. [unfilled] for consultation for [unfilled]. [Attached please find my note.] : Attached please find my note. [Thank you very much for allowing me to participate in the care of this patient. If you have any questions, please do not hesitate to contact me] : Thank you very much for allowing me to participate in the care of this patient. If you have any questions, please do not hesitate to contact me. [Dear  ___] : Dear  [unfilled], [Dear  ___] : Dear ~FRANCES,

## 2024-03-15 NOTE — DISCUSSION/SUMMARY
[FreeTextEntry1] : The patient was counseled regarding the pathophysiolog, evaluation and management of overactive bladder, mixed urinary incontinence and pelvic organ prolapse she was also counseled regarding the risks, benefits, indications, and alternatives of further evaluations studies, as well as various management options. She was given verbal and written information/education on pelvic floor muscle exercises, avoidance of dietary bladder irritants, and other strategies to improve bladder control. She was counseled regarding treatment options for stress urinary incontinence including pelvic floor PT, pessary placement and surgical management with midurethral sling. AUGS interactive tool was used to explain normal anatomy as well as alteration in urethral support associated with stress urinary incontinence. Midurethral sling placement as well as urethral bulking was discussed. Pharmacologic management of overactive bladder and urgency incontinence with medication such as Myrbetriq 25 mg p.o. daily or Solifenacin 5 mg p.o. daily was discussed.  Also reviewed role of pessary as well as surgical management options of pelvic organ prolapse after a detailed discussion, following management plan was outlined: 1.  Patient is bothered by prolapse.  She is active and does not want to be limited by urine frequency and pelvic pressure sensation.  She does not want to use pessary.  I reviewed surgical management options in the form of robot-assisted supracervical hysterectomy, BSO and sacrocolpopexy versus vaginal hysterectomy, uterosacral ligament, anterior and posterior repair.  Pros and cons of each approach, postoperative restrictions, recovery time, efficacy, potential complications etc. were reviewed with the patient.  She would like to proceed with the most effective option.  She will review the brochure that I provided to her and we will discuss it further on follow-up visit 2. UA with micro and urine culture was ordered to exclude UTI. If there is evidence of UTI, will recommend antibiotic such as Keflex 500 mg po bid x 7 days. 3. Night time fluid restriction and other strategies to decrease nocturia were reviewed.  She is taking her antihypertensive hypertensives at dinnertime.  I advised her to take her blood pressure pills in the morning.  She might also have sleep apnea due to her body habitus.  If she continues to have nocturia, I will recommend screening transfer sleep apnea 4. Discussed importance of maintaining optimal glycemic control in for bladder health.  I explained to her that her urinary frequency and nocturia is very likely related to suboptimally controlled diabetes and hyperglycemia.  I also clearly informed her that she will not be able to undergo surgery unless her hemoglobin A1c is under 8.  She is due to have repeat blood work done in 1 to 2 weeks.  I advised her to follow up with her endocrinologist to work on improving glycemic control 5.Recommended  urodynamic testing for further evaluation of urinary incontinence.  I discussed the procedure of retropubic mid urethral sling for stress urinary incontinence at the time of prolapse surgery.  She is open to it.. .  6.  She is interested in medical management of nocturia and urinary frequency with medications such as Solifenacin 5 mg p.o. daily or Myrbetriq 25 mg p.o. daily.  However I would like her to undergo urodynamic testing first prior to starting the medication 7.  Pap smear was done today 8.  Recommended pelvic ultrasound to exclude thickened endometrium, adnexal mass etc for perioperative planning 9.  Follow-up after urodynamic testing and pelvic ultrasound

## 2024-03-15 NOTE — ASSESSMENT
[FreeTextEntry1] : Patient is a 73 year-old multipara with medical history significant for suboptimally controlled diabetes, class I obesity, and HTN, now presenting with mixed urinary incontinence (urgency greater than stress), overactive bladder , nocturia and stage II pelvic organ prolapse involving the anterior vaginal wall and cervix for at least past 2 years. On examination, there is evidence of urethral hypermobility with a negative empty bladder supine cough stress test, and normal postvoid residual. She has no history of recurrent UTI.

## 2024-03-15 NOTE — HISTORY OF PRESENT ILLNESS
[FreeTextEntry1] : Patient is a 73 year para 2 ( x 2) with medical history significant for suboptimally controlled diabetes, and HTN, who presents today for evaluation and management of urinary frequency.   Patient reports increased urinary frequency and urinary leakage for over past 5 years. But it has gotten worse over the past 1-2 years. She mostly leaks with urgency when she doesn't make it to the restroom.                                Daytime frequency: q 1 hr Nocturia: wakes up to 4 times per night-she takes her blood pressure pills at night. Leakage of urine with coughing sneezing laughing: Sometimes. Incontinence pad use: She is using 2 pads per day.  She previously a panty liner would be enough but now she is requiring menstrual pads Sensation of incomplete bladder emptying: Denies History of frequent urinary tract infections: Denies History of hematuria: Denies History of nephrolithiasis: Denies Daily fluid intake: Previous treatments: None Vaginal symptoms: She reports feeling a bulge at the opening of the vagina when she wipes herself.  She thinks is her bladder which is dropping.  She started feeling it approximately 2 years ago.  She does not feel that the bulge has worsened.  Not sexually active Bowel symptoms: She denies constipation.  Denies diarrhea.  Denies fecal incontinence     GYN: LMP ~ at age 50 years. Denies hx of Postmenopausal bleeding. Hasn't had a pap smear or GYN visit for over 20 years. Reports MMG was normal ~2 months ago PMH: DM 9most recent HbA1C was 9.9 in 2023, HTN, Hyperlipidemia PSH: cataract surgery

## 2024-03-18 LAB
APPEARANCE: CLEAR
BACTERIA UR CULT: NORMAL
BACTERIA: NEGATIVE /HPF
BILIRUBIN URINE: NEGATIVE
BLOOD URINE: NEGATIVE
CAST: 1 /LPF
COLOR: YELLOW
EPITHELIAL CELLS: 0 /HPF
GLUCOSE QUALITATIVE U: >=1000 MG/DL
KETONES URINE: NEGATIVE MG/DL
LEUKOCYTE ESTERASE URINE: NEGATIVE
MICROSCOPIC-UA: NORMAL
NITRITE URINE: NEGATIVE
PH URINE: 5.5
PROTEIN URINE: NEGATIVE MG/DL
RED BLOOD CELLS URINE: 0 /HPF
SPECIFIC GRAVITY URINE: >1.03
UROBILINOGEN URINE: 0.2 MG/DL
WHITE BLOOD CELLS URINE: 0 /HPF

## 2024-03-19 LAB — HPV HIGH+LOW RISK DNA PNL CVX: NOT DETECTED

## 2024-03-20 LAB — CYTOLOGY CVX/VAG DOC THIN PREP: NORMAL

## 2024-05-03 ENCOUNTER — APPOINTMENT (OUTPATIENT)
Dept: UROGYNECOLOGY | Facility: CLINIC | Age: 74
End: 2024-05-03

## 2024-06-11 ENCOUNTER — RX RENEWAL (OUTPATIENT)
Age: 74
End: 2024-06-11

## 2024-06-11 RX ORDER — LISINOPRIL AND HYDROCHLOROTHIAZIDE TABLETS 10; 12.5 MG/1; MG/1
10-12.5 TABLET ORAL DAILY
Qty: 90 | Refills: 0 | Status: ACTIVE | COMMUNITY
Start: 2024-06-11 | End: 1900-01-01

## 2024-06-17 ENCOUNTER — RX RENEWAL (OUTPATIENT)
Age: 74
End: 2024-06-17

## 2024-06-19 RX ORDER — INSULIN GLARGINE 100 [IU]/ML
100 INJECTION, SOLUTION SUBCUTANEOUS
Qty: 2 | Refills: 0 | Status: ACTIVE | COMMUNITY
Start: 2023-04-26 | End: 1900-01-01

## 2024-07-05 ENCOUNTER — APPOINTMENT (OUTPATIENT)
Dept: UROGYNECOLOGY | Facility: CLINIC | Age: 74
End: 2024-07-05
Payer: MEDICARE

## 2024-07-05 ENCOUNTER — RESULT CHARGE (OUTPATIENT)
Age: 74
End: 2024-07-05

## 2024-07-05 DIAGNOSIS — N39.46 MIXED INCONTINENCE: ICD-10-CM

## 2024-07-05 DIAGNOSIS — N81.2 INCOMPLETE UTEROVAGINAL PROLAPSE: ICD-10-CM

## 2024-07-05 DIAGNOSIS — R35.0 FREQUENCY OF MICTURITION: ICD-10-CM

## 2024-07-05 LAB
BILIRUB UR QL STRIP: NEGATIVE
CLARITY UR: CLEAR
COLLECTION METHOD: NORMAL
GLUCOSE UR-MCNC: NORMAL
HCG UR QL: NORMAL EU/DL
HGB UR QL STRIP.AUTO: NORMAL
KETONES UR-MCNC: NEGATIVE
LEUKOCYTE ESTERASE UR QL STRIP: NEGATIVE
NITRITE UR QL STRIP: NEGATIVE
PH UR STRIP: 5
PROT UR STRIP-MCNC: NEGATIVE
SP GR UR STRIP: 1

## 2024-07-05 PROCEDURE — 99214 OFFICE O/P EST MOD 30 MIN: CPT | Mod: 25

## 2024-07-05 PROCEDURE — 51728 CYSTOMETROGRAM W/VP: CPT

## 2024-07-05 PROCEDURE — 81003 URINALYSIS AUTO W/O SCOPE: CPT | Mod: QW

## 2024-07-05 PROCEDURE — 51797 INTRAABDOMINAL PRESSURE TEST: CPT

## 2024-07-05 PROCEDURE — 51784 ANAL/URINARY MUSCLE STUDY: CPT

## 2024-07-05 RX ORDER — CEPHALEXIN 500 MG/1
500 CAPSULE ORAL
Qty: 14 | Refills: 0 | Status: ACTIVE | COMMUNITY
Start: 2024-07-05 | End: 1900-01-01

## 2024-07-05 RX ORDER — PHENAZOPYRIDINE 200 MG/1
200 TABLET, FILM COATED ORAL 3 TIMES DAILY
Qty: 15 | Refills: 0 | Status: ACTIVE | COMMUNITY
Start: 2024-07-05 | End: 1900-01-01

## 2024-07-08 LAB
APPEARANCE: CLEAR
BACTERIA UR CULT: NORMAL
BACTERIA: NEGATIVE /HPF
BILIRUBIN URINE: NEGATIVE
BLOOD URINE: NEGATIVE
CAST: 0 /LPF
COLOR: YELLOW
EPITHELIAL CELLS: 1 /HPF
GLUCOSE QUALITATIVE U: >=1000 MG/DL
KETONES URINE: NEGATIVE MG/DL
LEUKOCYTE ESTERASE URINE: NEGATIVE
MICROSCOPIC-UA: NORMAL
NITRITE URINE: NEGATIVE
PH URINE: 5.5
PROTEIN URINE: NEGATIVE MG/DL
RED BLOOD CELLS URINE: 0 /HPF
SPECIFIC GRAVITY URINE: >1.03
UROBILINOGEN URINE: 0.2 MG/DL
WHITE BLOOD CELLS URINE: 0 /HPF

## 2024-09-17 ENCOUNTER — APPOINTMENT (OUTPATIENT)
Dept: ENDOCRINOLOGY | Facility: CLINIC | Age: 74
End: 2024-09-17
Payer: MEDICARE

## 2024-09-17 VITALS
HEIGHT: 66 IN | DIASTOLIC BLOOD PRESSURE: 62 MMHG | BODY MASS INDEX: 30.05 KG/M2 | TEMPERATURE: 95.7 F | WEIGHT: 187 LBS | HEART RATE: 55 BPM | OXYGEN SATURATION: 99 % | SYSTOLIC BLOOD PRESSURE: 136 MMHG

## 2024-09-17 DIAGNOSIS — E11.9 TYPE 2 DIABETES MELLITUS W/OUT COMPLICATIONS: ICD-10-CM

## 2024-09-17 DIAGNOSIS — E78.5 HYPERLIPIDEMIA, UNSPECIFIED: ICD-10-CM

## 2024-09-17 DIAGNOSIS — E05.90 THYROTOXICOSIS, UNSPECIFIED W/OUT THYROTOXIC CRISIS OR STORM: ICD-10-CM

## 2024-09-17 LAB — GLUCOSE BLDC GLUCOMTR-MCNC: 311

## 2024-09-17 PROCEDURE — 82962 GLUCOSE BLOOD TEST: CPT

## 2024-09-17 PROCEDURE — G2211 COMPLEX E/M VISIT ADD ON: CPT

## 2024-09-17 PROCEDURE — 99214 OFFICE O/P EST MOD 30 MIN: CPT

## 2024-09-17 RX ORDER — METHIMAZOLE 10 MG/1
10 TABLET ORAL EVERY MORNING
Refills: 0 | Status: ACTIVE | COMMUNITY

## 2024-09-17 RX ORDER — FLASH GLUCOSE SCANNING READER
EACH MISCELLANEOUS
Refills: 0 | Status: ACTIVE | COMMUNITY

## 2024-09-17 NOTE — HISTORY OF PRESENT ILLNESS
[FreeTextEntry1] : 74-year-old white female with a past medical history of for type 2 diabetes and who is currently taking insulin Lantus 30 units every day at night together with the insulin Humalog 20 units only in the morning.  Patient was supposed to take Humalog insulin 3 times a day but apparently she forgot to take it as instructed before.  She also has a history of hyperthyroidism for which she is on methimazole 10 mg tablets daily and she also takes Farxiga 10 mg daily.  Patient glucose levels have been elevated between 170 to 200 mg in the morning.  She claims that she is trying to comply with her diet.  She does have significant polyuria and polydipsia and nocturia.  She denies any numbness of extremities or any visual changes.  She has not noticed any chest pain shortness of breath or dysuria.  Patient denies any significant weight loss or any palpitations complain of being anxious especially at nighttime.

## 2024-09-17 NOTE — ASSESSMENT
[Diabetes Foot Care] : diabetes foot care [Long Term Vascular Complications] : long term vascular complications of diabetes [Carbohydrate Consistent Diet] : carbohydrate consistent diet [Importance of Diet and Exercise] : importance of diet and exercise to improve glycemic control, achieve weight loss and improve cardiovascular health [Exercise/Effect on Glucose] : exercise/effect on glucose [Hypoglycemia Management] : hypoglycemia management [Self Monitoring of Blood Glucose] : self monitoring of blood glucose [Retinopathy Screening] : Patient was referred to ophthalmology for retinopathy screening [FreeTextEntry1] : Middle-aged white female who has a past medical history type 2 diabetes which until recently has been uncontrolled.  Her last blood test was performed in June of this year which showed hemoglobin A1c of 10.3%, LDL was 71, TSH is 1.07 Free T4 is 1.0.  Her complete metabolic panel is normal except for a glucose of 190 mg per DL.  Patient is noncompliant with her insulin regimen and is missing regular dose of Humalog insulin before the meals.  Recommendation 1.  Patient is strongly advised to take her insulin Humalog 15 to 20 units 3 times a day before meals.  Patient claimed that she eats a very light dinner and for this reason I have advised her to lower the Humalog to 10 to 15 units before the dinner but she should take 20 units before breakfast and 20 units before her supper. 2.  Patient will continue with insulin Lantus 25 units every day at night and Farxiga 10 mg tablet daily 3.  We will also arrange for a glucose monitoring system so that the patient can have a better control of her glucose levels. 4.  I am also referring the patient for a complete blood test which she will have it done at her physician's office after which she will return for follow-up evaluation. 5.  The importance of strict diet and control of her blood glucose was discussed with the patient.  The long-term risk of vascular complications was explained to the patient. 6.  If clinically stable then the patient will follow-up in 3 months time thank you

## 2024-09-19 ENCOUNTER — APPOINTMENT (OUTPATIENT)
Dept: UROGYNECOLOGY | Facility: CLINIC | Age: 74
End: 2024-09-19

## 2024-09-19 DIAGNOSIS — R35.0 FREQUENCY OF MICTURITION: ICD-10-CM

## 2024-09-19 PROCEDURE — 99214 OFFICE O/P EST MOD 30 MIN: CPT

## 2024-09-19 PROCEDURE — 81003 URINALYSIS AUTO W/O SCOPE: CPT | Mod: QW

## 2024-09-19 NOTE — DISCUSSION/SUMMARY
[FreeTextEntry1] : I discussed the findings of urodynamic testing.  She is bothered by the urine leakage.  If she decides to proceed with surgical management of prolapse, I will recommend concomitant retropubic mid urethral sling placement. We discussed the surgical procedure of hysterectomy, BSO, uterosacral ligament suspension, anterior and posterior repair and retropubic mid urethral sling placement.  Discussed the hospital stay, postoperative recovery, postoperative restrictions etc.  Based on her hemoglobin A1c from June 2023, she is not a candidate for an elective procedure.  I discussed the importance of improving glycemic control for perioperative healing and prevention of surgical site infection etc. I advised her to undergo the lab work that Dr. Chaudhary ordered and then follow-up with her office.  She will likely need GLP-1 injections to achieve optimal weight control and to lose.  I also advised her to discuss diabetic a consultation with dietitian.  She verbalized understanding. In the meantime I offered her a trial of pessary for treatment of pelvic organ prolapse and urinary incontinence.  Discussed pessary fitting, pessary maintenance exams etc. after a detailed discussion, she does not want to pursue pessary fitting at this point.  I advised her to think about it.  She may call the office for pessary fitting appointment if she changes her mind.  Otherwise I will see her back in 3 months Voided urine specimen was sent for urinalysis and culture.  If there is evidence of urinary tract infection, will recommend antibiotic such as Keflex 500 mg p.o. twice daily for 7 days

## 2024-09-19 NOTE — HISTORY OF PRESENT ILLNESS
[FreeTextEntry1] : Patient is a 74 year-old multipara with medical history significant for suboptimally controlled diabetes with most recent hemoglobin A1c of 10.3 in June 2024, class I obesity, and HTN, now presenting with mixed urinary incontinence (urgency greater than stress), overactive bladder , nocturia and stage II pelvic organ prolapse involving the anterior vaginal wall and cervix for at more than 2 years.  Patient was initially seen in the office in March 2024 for consultation.  During that exam, she was found to have urethral hypermobility with a negative empty bladder supine cough stress test, and normal postvoid residual. She has no history of recurrent UTI. She presented for urodynamic testing on 7/5/2024. Her urodynamic test findings include inability to void for uroflow with postvoid residual of 30 cc; her complex cystogram showed normal sensation, normal cystometric capacity of 581 cc, normal compliance, absence of detrusor overactivity during filling and presence of mild stress urinary incontinence at filled volume of 588 cc ; she voided 562 cc for the pressure voiding studyvafter removal of the pressure transducers. Patient did not follow-up since her urodynamic testing.  She presents today stating that she she was taking care of her property back in [facility].  She has recently returned.  She continues to have poor glycemic control.  She just saw Dr. Chaudhary who ordered some labs.  She has not done the lab work She is very bothered by the urine incontinence.  She states that she has to wear a large pad all the time she also reports frequent nighttime and daytime urination.

## 2024-09-19 NOTE — ASSESSMENT
[FreeTextEntry1] : Patient's urine test findings are consistent with normal sensation, normal compliance, normal cystometric capacity, presence of mild stress urinary incontinence at filled volume of 588 cc after removal of the pressure transducers and absence of voiding dysfunction.

## 2024-09-20 LAB
ALBUMIN SERPL ELPH-MCNC: 4.2 G/DL
ALP BLD-CCNC: 112 U/L
ALT SERPL-CCNC: 25 U/L
ANION GAP SERPL CALC-SCNC: 11 MMOL/L
APPEARANCE: CLEAR
AST SERPL-CCNC: 18 U/L
BACTERIA: NEGATIVE /HPF
BILIRUB SERPL-MCNC: 0.4 MG/DL
BILIRUBIN URINE: NEGATIVE
BLOOD URINE: NEGATIVE
BUN SERPL-MCNC: 42 MG/DL
CALCIUM SERPL-MCNC: 9.9 MG/DL
CAST: 1 /LPF
CHLORIDE SERPL-SCNC: 99 MMOL/L
CO2 SERPL-SCNC: 25 MMOL/L
COLOR: YELLOW
CREAT SERPL-MCNC: 1.27 MG/DL
EGFR: 44 ML/MIN/1.73M2
EPITHELIAL CELLS: 0 /HPF
ESTIMATED AVERAGE GLUCOSE: 235 MG/DL
GLUCOSE QUALITATIVE U: >=1000 MG/DL
GLUCOSE SERPL-MCNC: 284 MG/DL
HBA1C MFR BLD HPLC: 9.8 %
KETONES URINE: NEGATIVE MG/DL
LEUKOCYTE ESTERASE URINE: NEGATIVE
MICROSCOPIC-UA: NORMAL
NITRITE URINE: NEGATIVE
PH URINE: 5.5
POTASSIUM SERPL-SCNC: 4.7 MMOL/L
PROT SERPL-MCNC: 6.8 G/DL
PROTEIN URINE: NEGATIVE MG/DL
RED BLOOD CELLS URINE: 1 /HPF
SODIUM SERPL-SCNC: 135 MMOL/L
SPECIFIC GRAVITY URINE: >1.03
T4 FREE SERPL-MCNC: 0.9 NG/DL
TSH SERPL-ACNC: 2.15 UIU/ML
UROBILINOGEN URINE: 0.2 MG/DL
WHITE BLOOD CELLS URINE: 0 /HPF

## 2024-09-20 RX ORDER — SEMAGLUTIDE 0.25 MG/.5ML
0.25 INJECTION, SOLUTION SUBCUTANEOUS
Qty: 4 | Refills: 1 | Status: ACTIVE | COMMUNITY
Start: 2024-09-20 | End: 1900-01-01

## 2024-09-23 LAB — BACTERIA UR CULT: NORMAL

## 2024-09-24 ENCOUNTER — OFFICE (OUTPATIENT)
Facility: LOCATION | Age: 74
Setting detail: OPHTHALMOLOGY
End: 2024-09-24
Payer: MEDICARE

## 2024-09-24 DIAGNOSIS — H52.4: ICD-10-CM

## 2024-09-24 DIAGNOSIS — E11.3293: ICD-10-CM

## 2024-09-24 DIAGNOSIS — H35.373: ICD-10-CM

## 2024-09-24 DIAGNOSIS — H35.341: ICD-10-CM

## 2024-09-24 PROCEDURE — 92015 DETERMINE REFRACTIVE STATE: CPT | Mod: GA | Performed by: OPHTHALMOLOGY

## 2024-09-24 PROCEDURE — 92134 CPTRZ OPH DX IMG PST SGM RTA: CPT | Performed by: OPHTHALMOLOGY

## 2024-09-24 PROCEDURE — 92014 COMPRE OPH EXAM EST PT 1/>: CPT | Performed by: OPHTHALMOLOGY

## 2024-09-24 ASSESSMENT — CONFRONTATIONAL VISUAL FIELD TEST (CVF)
OS_FINDINGS: FULL
OD_FINDINGS: FULL

## 2024-09-26 RX ORDER — INSULIN GLARGINE 100 [IU]/ML
100 INJECTION, SOLUTION SUBCUTANEOUS
Qty: 2 | Refills: 1 | Status: ACTIVE | COMMUNITY
Start: 1900-01-01 | End: 1900-01-01

## 2024-10-07 ENCOUNTER — APPOINTMENT (OUTPATIENT)
Dept: ENDOCRINOLOGY | Facility: CLINIC | Age: 74
End: 2024-10-07
Payer: MEDICARE

## 2024-10-07 PROCEDURE — 95249 CONT GLUC MNTR PT PROV EQP: CPT

## 2024-10-09 ENCOUNTER — RX RENEWAL (OUTPATIENT)
Age: 74
End: 2024-10-09

## 2024-10-09 RX ORDER — TIRZEPATIDE 2.5 MG/.5ML
2.5 INJECTION, SOLUTION SUBCUTANEOUS
Qty: 1 | Refills: 0 | Status: ACTIVE | COMMUNITY
Start: 2024-10-09 | End: 1900-01-01

## 2024-10-18 ENCOUNTER — APPOINTMENT (OUTPATIENT)
Dept: ULTRASOUND IMAGING | Facility: CLINIC | Age: 74
End: 2024-10-18

## 2024-11-18 ENCOUNTER — NON-APPOINTMENT (OUTPATIENT)
Age: 74
End: 2024-11-18

## 2024-11-18 ENCOUNTER — APPOINTMENT (OUTPATIENT)
Dept: ENDOCRINOLOGY | Facility: CLINIC | Age: 74
End: 2024-11-18
Payer: MEDICARE

## 2024-11-18 PROCEDURE — G0108 DIAB MANAGE TRN  PER INDIV: CPT

## 2024-11-18 PROCEDURE — 95251 CONT GLUC MNTR ANALYSIS I&R: CPT

## 2024-11-21 ENCOUNTER — RX RENEWAL (OUTPATIENT)
Age: 74
End: 2024-11-21

## 2025-01-07 ENCOUNTER — RX RENEWAL (OUTPATIENT)
Age: 75
End: 2025-01-07

## 2025-01-07 ENCOUNTER — OFFICE (OUTPATIENT)
Facility: LOCATION | Age: 75
Setting detail: OPHTHALMOLOGY
End: 2025-01-07
Payer: MEDICARE

## 2025-01-07 DIAGNOSIS — H35.62: ICD-10-CM

## 2025-01-07 DIAGNOSIS — E11.3293: ICD-10-CM

## 2025-01-07 DIAGNOSIS — H35.341: ICD-10-CM

## 2025-01-07 PROCEDURE — 99213 OFFICE O/P EST LOW 20 MIN: CPT | Performed by: OPHTHALMOLOGY

## 2025-01-07 PROCEDURE — 92250 FUNDUS PHOTOGRAPHY W/I&R: CPT | Performed by: OPHTHALMOLOGY

## 2025-01-07 ASSESSMENT — SUPERFICIAL PUNCTATE KERATITIS (SPK)
OD_SPK: T 1+
OS_SPK: T 1+

## 2025-01-07 ASSESSMENT — TONOMETRY
OS_IOP_MMHG: 16
OD_IOP_MMHG: 16

## 2025-01-10 ASSESSMENT — REFRACTION_TRIALFRAME
OS_SPHERE: -0.50
OD_AXIS: 160
OS_ADD: +2.75
OD_CYLINDER: +1.00
OD_VA1: 20/30-2
OD_ADD: +2.75
OD_VA2: 20/20(J1+)-2
OD_SPHERE: PLANO
OS_CYLINDER: SPHERE
OS_VA1: 20/30-2

## 2025-01-10 ASSESSMENT — REFRACTION_CURRENTRX
OS_CYLINDER: +0.25
OS_ADD: +2.25
OD_ADD: +2.25
OD_AXIS: 155
OD_SPHERE: PLANO
OS_AXIS: 093
OS_SPHERE: -0.50
OD_CYLINDER: +1.00
OS_OVR_VA: 20/
OD_OVR_VA: 20/

## 2025-01-10 ASSESSMENT — REFRACTION_MANIFEST
OS_SPHERE: -0.50
OS_VA1: 20/30
OS_CYLINDER: SPHERE
OD_ADD: +2.50
OD_CYLINDER: +1.00
OD_AXIS: 160
OD_VA1: 20/30
OD_SPHERE: PLANO
OS_ADD: +2.50

## 2025-01-10 ASSESSMENT — REFRACTION_AUTOREFRACTION
OS_SPHERE: -1.00
OD_AXIS: 155
OS_AXIS: 086
OS_CYLINDER: +2.00
OD_CYLINDER: +1.75
OD_SPHERE: -1.50

## 2025-01-10 ASSESSMENT — VISUAL ACUITY
OS_BCVA: 20/30-2
OD_BCVA: 20/30-2

## 2025-01-15 ENCOUNTER — APPOINTMENT (OUTPATIENT)
Dept: ULTRASOUND IMAGING | Facility: CLINIC | Age: 75
End: 2025-01-15
Payer: MEDICARE

## 2025-01-15 PROCEDURE — 93880 EXTRACRANIAL BILAT STUDY: CPT

## 2025-01-24 ENCOUNTER — APPOINTMENT (OUTPATIENT)
Dept: ENDOCRINOLOGY | Facility: CLINIC | Age: 75
End: 2025-01-24
Payer: MEDICARE

## 2025-01-24 VITALS
SYSTOLIC BLOOD PRESSURE: 126 MMHG | TEMPERATURE: 97.7 F | WEIGHT: 192 LBS | BODY MASS INDEX: 30.86 KG/M2 | OXYGEN SATURATION: 99 % | HEART RATE: 68 BPM | HEIGHT: 66 IN | DIASTOLIC BLOOD PRESSURE: 62 MMHG

## 2025-01-24 DIAGNOSIS — E11.9 TYPE 2 DIABETES MELLITUS W/OUT COMPLICATIONS: ICD-10-CM

## 2025-01-24 DIAGNOSIS — E78.5 HYPERLIPIDEMIA, UNSPECIFIED: ICD-10-CM

## 2025-01-24 DIAGNOSIS — R73.9 HYPERGLYCEMIA, UNSPECIFIED: ICD-10-CM

## 2025-01-24 DIAGNOSIS — R73.09 OTHER ABNORMAL GLUCOSE: ICD-10-CM

## 2025-01-24 DIAGNOSIS — E05.90 THYROTOXICOSIS, UNSPECIFIED W/OUT THYROTOXIC CRISIS OR STORM: ICD-10-CM

## 2025-01-24 PROCEDURE — 99214 OFFICE O/P EST MOD 30 MIN: CPT

## 2025-01-24 RX ORDER — TIRZEPATIDE 5 MG/.5ML
5 INJECTION, SOLUTION SUBCUTANEOUS
Qty: 12 | Refills: 0 | Status: ACTIVE | COMMUNITY
Start: 2025-01-24 | End: 1900-01-01

## 2025-01-28 PROBLEM — R73.09 ELEVATED HEMOGLOBIN A1C: Status: ACTIVE | Noted: 2025-01-28

## 2025-01-28 PROBLEM — R73.9 HYPERGLYCEMIA: Status: ACTIVE | Noted: 2025-01-28

## 2025-01-28 LAB — HBA1C MFR BLD HPLC: 8

## 2025-06-17 ENCOUNTER — APPOINTMENT (OUTPATIENT)
Dept: ENDOCRINOLOGY | Facility: CLINIC | Age: 75
End: 2025-06-17

## 2025-06-17 LAB — HBA1C MFR BLD HPLC: 8

## 2025-07-16 ENCOUNTER — APPOINTMENT (OUTPATIENT)
Dept: ENDOCRINOLOGY | Facility: CLINIC | Age: 75
End: 2025-07-16

## 2025-07-18 ENCOUNTER — APPOINTMENT (OUTPATIENT)
Dept: ENDOCRINOLOGY | Facility: CLINIC | Age: 75
End: 2025-07-18

## 2025-07-18 VITALS
OXYGEN SATURATION: 96 % | DIASTOLIC BLOOD PRESSURE: 74 MMHG | HEIGHT: 66 IN | BODY MASS INDEX: 31.02 KG/M2 | SYSTOLIC BLOOD PRESSURE: 122 MMHG | WEIGHT: 193 LBS | HEART RATE: 63 BPM | TEMPERATURE: 98.4 F

## 2025-07-18 PROCEDURE — 95251 CONT GLUC MNTR ANALYSIS I&R: CPT

## 2025-07-18 PROCEDURE — 99214 OFFICE O/P EST MOD 30 MIN: CPT

## 2025-07-18 PROCEDURE — G2211 COMPLEX E/M VISIT ADD ON: CPT

## 2025-07-18 RX ORDER — TIRZEPATIDE 7.5 MG/.5ML
7.5 INJECTION, SOLUTION SUBCUTANEOUS
Qty: 12 | Refills: 1 | Status: ACTIVE | COMMUNITY
Start: 2025-07-18 | End: 1900-01-01

## 2025-08-01 ENCOUNTER — OFFICE (OUTPATIENT)
Facility: LOCATION | Age: 75
Setting detail: OPHTHALMOLOGY
End: 2025-08-01
Payer: MEDICARE

## 2025-08-01 DIAGNOSIS — H35.373: ICD-10-CM

## 2025-08-01 DIAGNOSIS — E11.3293: ICD-10-CM

## 2025-08-01 DIAGNOSIS — H16.223: ICD-10-CM

## 2025-08-01 DIAGNOSIS — H35.62: ICD-10-CM

## 2025-08-01 PROCEDURE — 92014 COMPRE OPH EXAM EST PT 1/>: CPT | Performed by: OPHTHALMOLOGY

## 2025-08-01 PROCEDURE — 92250 FUNDUS PHOTOGRAPHY W/I&R: CPT | Performed by: OPHTHALMOLOGY

## 2025-08-01 ASSESSMENT — REFRACTION_CURRENTRX
OD_AXIS: 166
OS_ADD: +2.75
OS_OVR_VA: 20/
OS_AXIS: 113
OS_SPHERE: -0.50
OD_VPRISM_DIRECTION: PROGS
OS_VPRISM_DIRECTION: PROGS
OS_CYLINDER: +0.25
OD_SPHERE: PLANO
OD_OVR_VA: 20/
OD_CYLINDER: +1.00
OD_ADD: +2.75

## 2025-08-01 ASSESSMENT — VISUAL ACUITY
OD_BCVA: 20/30+1
OS_BCVA: 20/30-2

## 2025-08-01 ASSESSMENT — REFRACTION_TRIALFRAME
OD_CYLINDER: +1.00
OS_VA1: 20/30-2
OD_AXIS: 160
OD_ADD: +2.75
OS_CYLINDER: SPHERE
OD_VA2: 20/20(J1+)-2
OS_ADD: +2.75
OD_SPHERE: PLANO
OD_VA1: 20/30-2
OS_SPHERE: -0.50

## 2025-08-01 ASSESSMENT — CONFRONTATIONAL VISUAL FIELD TEST (CVF)
OD_FINDINGS: FULL
OS_FINDINGS: FULL

## 2025-08-01 ASSESSMENT — REFRACTION_AUTOREFRACTION
OS_SPHERE: PLANO
OD_SPHERE: -0.75
OD_CYLINDER: +0.75
OS_CYLINDER: +1.25
OS_AXIS: 111
OD_AXIS: 166

## 2025-08-01 ASSESSMENT — SUPERFICIAL PUNCTATE KERATITIS (SPK)
OS_SPK: T 1+
OD_SPK: T 1+

## 2025-08-01 ASSESSMENT — REFRACTION_MANIFEST
OS_ADD: +2.50
OD_AXIS: 160
OD_CYLINDER: +1.00
OD_ADD: +2.50
OD_VA1: 20/30
OS_CYLINDER: SPHERE
OS_VA1: 20/30
OD_SPHERE: PLANO
OS_SPHERE: -0.50

## 2025-08-01 ASSESSMENT — TONOMETRY
OS_IOP_MMHG: 15
OD_IOP_MMHG: 16

## 2025-09-11 ENCOUNTER — APPOINTMENT (OUTPATIENT)
Dept: RADIOLOGY | Facility: CLINIC | Age: 75
End: 2025-09-11
Payer: MEDICARE

## 2025-09-11 PROCEDURE — 72110 X-RAY EXAM L-2 SPINE 4/>VWS: CPT
